# Patient Record
Sex: MALE | Race: WHITE | NOT HISPANIC OR LATINO
[De-identification: names, ages, dates, MRNs, and addresses within clinical notes are randomized per-mention and may not be internally consistent; named-entity substitution may affect disease eponyms.]

---

## 2022-06-07 ENCOUNTER — APPOINTMENT (OUTPATIENT)
Dept: ORTHOPEDIC SURGERY | Facility: CLINIC | Age: 57
End: 2022-06-07
Payer: COMMERCIAL

## 2022-06-07 ENCOUNTER — APPOINTMENT (OUTPATIENT)
Dept: ORTHOPEDIC SURGERY | Facility: CLINIC | Age: 57
End: 2022-06-07

## 2022-06-07 PROBLEM — Z00.00 ENCOUNTER FOR PREVENTIVE HEALTH EXAMINATION: Status: ACTIVE | Noted: 2022-06-07

## 2022-06-07 PROCEDURE — 99204 OFFICE O/P NEW MOD 45 MIN: CPT

## 2022-06-07 PROCEDURE — 73562 X-RAY EXAM OF KNEE 3: CPT | Mod: RT

## 2022-06-07 NOTE — PHYSICAL EXAM
[Right] : right knee [5___] : hamstring 5[unfilled]/5 [] : positive Valgus instability [Equivocal] : equivocal Talisha [TWNoteComboBox7] : flexion 125 degrees [de-identified] : extension 0 degrees

## 2022-06-07 NOTE — HISTORY OF PRESENT ILLNESS
[de-identified] : Date of  Onset 2-3 weeks  \par Pain: At Rest: 5 /10   \par With Activity: 9 /10 \par Affecting Sleep: N\par Difficulty with stairs: Y\par Difficulty getting in and out of car: Y\par Sit to stand stiffness:N\par Mechanism of injury:  MAYBE WHILE RAKING LEAVES \par  \par  QUALITY:  SHARP SHOOTING PAIN \par Improves with: ADVIL AND TYLENOL  \par Worse with:     ACTIVITY \par  \par Out of work:     NO\par  Occupation:   FOR  \par \par \par  \par

## 2022-06-07 NOTE — DISCUSSION/SUMMARY
[de-identified] : Due to PE and limitations, Pt most likely has a medial meniscus tear and will get MRI to confirm. Pt is to return with MRI results for further evaluation.

## 2022-06-10 ENCOUNTER — RESULT REVIEW (OUTPATIENT)
Age: 57
End: 2022-06-10

## 2022-06-23 ENCOUNTER — APPOINTMENT (OUTPATIENT)
Dept: ORTHOPEDIC SURGERY | Facility: CLINIC | Age: 57
End: 2022-06-23
Payer: COMMERCIAL

## 2022-06-23 DIAGNOSIS — I10 ESSENTIAL (PRIMARY) HYPERTENSION: ICD-10-CM

## 2022-06-23 PROCEDURE — 99214 OFFICE O/P EST MOD 30 MIN: CPT

## 2022-06-23 RX ORDER — MONTELUKAST 10 MG/1
10 TABLET, FILM COATED ORAL
Qty: 90 | Refills: 0 | Status: ACTIVE | COMMUNITY
Start: 2022-05-25

## 2022-06-23 RX ORDER — PANTOPRAZOLE 40 MG/1
40 TABLET, DELAYED RELEASE ORAL
Qty: 90 | Refills: 0 | Status: ACTIVE | COMMUNITY
Start: 2022-05-25

## 2022-06-23 RX ORDER — ESCITALOPRAM OXALATE 20 MG/1
20 TABLET ORAL
Qty: 90 | Refills: 0 | Status: ACTIVE | COMMUNITY
Start: 2022-05-29

## 2022-06-23 RX ORDER — AMLODIPINE BESYLATE 5 MG/1
5 TABLET ORAL
Qty: 90 | Refills: 0 | Status: ACTIVE | COMMUNITY
Start: 2022-04-19

## 2022-06-23 RX ORDER — LOSARTAN POTASSIUM AND HYDROCHLOROTHIAZIDE 12.5; 5 MG/1; MG/1
50-12.5 TABLET ORAL
Qty: 180 | Refills: 0 | Status: ACTIVE | COMMUNITY
Start: 2022-04-19

## 2022-06-23 NOTE — DATA REVIEWED
[MRI] : MRI [Right] : of the right [Knee] : knee [FreeTextEntry1] : Horizontal tear of the body and body/posterior horn junction medial meniscus\par violating the inferior meniscal surface with a radial component at the body\par medial meniscus.\par \par Mild patellofemoral compartment osteoarthritis.\par \par Moderate medial compartment osteoarthritis.\par \par Moderate-sized joint effusion with diffuse synovial proliferation.\par \par

## 2022-06-23 NOTE — PHYSICAL EXAM
[Right] : right knee [5___] : hamstring 5[unfilled]/5 [] : positive Valgus instability [Equivocal] : equivocal Talisha [TWNoteComboBox7] : flexion 125 degrees [de-identified] : extension 0 degrees

## 2022-07-12 ENCOUNTER — FORM ENCOUNTER (OUTPATIENT)
Age: 57
End: 2022-07-12

## 2022-07-12 ENCOUNTER — APPOINTMENT (OUTPATIENT)
Dept: ORTHOPEDIC SURGERY | Facility: CLINIC | Age: 57
End: 2022-07-12

## 2022-07-12 VITALS — BODY MASS INDEX: 32.51 KG/M2 | WEIGHT: 240 LBS | HEIGHT: 72 IN

## 2022-07-12 DIAGNOSIS — Z78.9 OTHER SPECIFIED HEALTH STATUS: ICD-10-CM

## 2022-07-12 DIAGNOSIS — I10 ESSENTIAL (PRIMARY) HYPERTENSION: ICD-10-CM

## 2022-07-12 DIAGNOSIS — Z86.79 PERSONAL HISTORY OF OTHER DISEASES OF THE CIRCULATORY SYSTEM: ICD-10-CM

## 2022-07-12 PROCEDURE — 99214 OFFICE O/P EST MOD 30 MIN: CPT

## 2022-07-12 RX ORDER — IBUPROFEN 800 MG/1
800 TABLET, FILM COATED ORAL
Qty: 42 | Refills: 0 | Status: ACTIVE | COMMUNITY
Start: 2022-06-27

## 2022-07-13 NOTE — ASSESSMENT
[FreeTextEntry1] : I spent time going in detail the problem and the associated risks/benefits of surgery. detailed complications but not limited to: of nerve injury, non union, repeat fx, dvt/pe postop, instability,transfusion, infection, NVA injury, stiffness, leg length discrepancy, inability to ambulate & death. discussed implant and model shown to patient. answered all patient questions. patient understands procedure and postop directions. Patient has failed conservative treatment and  PT is contraindicated at this time\par \par \par patient has fx rib.  I spoke with anes and they are ok to do surgery since patient can deep breath and no difficulties\par patient has sleep apnea with a setting of 12 and will need to do surgery at [place].

## 2022-07-14 ENCOUNTER — FORM ENCOUNTER (OUTPATIENT)
Age: 57
End: 2022-07-14

## 2022-07-17 ENCOUNTER — FORM ENCOUNTER (OUTPATIENT)
Age: 57
End: 2022-07-17

## 2022-07-18 ENCOUNTER — FORM ENCOUNTER (OUTPATIENT)
Age: 57
End: 2022-07-18

## 2022-07-19 ENCOUNTER — FORM ENCOUNTER (OUTPATIENT)
Age: 57
End: 2022-07-19

## 2022-07-20 ENCOUNTER — APPOINTMENT (OUTPATIENT)
Dept: ORTHOPEDIC SURGERY | Facility: HOSPITAL | Age: 57
End: 2022-07-20

## 2022-07-20 ENCOUNTER — RESULT REVIEW (OUTPATIENT)
Age: 57
End: 2022-07-20

## 2022-07-20 PROCEDURE — 29881 ARTHRS KNE SRG MNISECTMY M/L: CPT | Mod: AS,RT

## 2022-07-20 PROCEDURE — 29881 ARTHRS KNE SRG MNISECTMY M/L: CPT | Mod: RT

## 2022-07-20 PROCEDURE — 20610 DRAIN/INJ JOINT/BURSA W/O US: CPT | Mod: RT,59

## 2022-07-24 ENCOUNTER — FORM ENCOUNTER (OUTPATIENT)
Age: 57
End: 2022-07-24

## 2022-07-28 ENCOUNTER — FORM ENCOUNTER (OUTPATIENT)
Age: 57
End: 2022-07-28

## 2022-08-01 ENCOUNTER — APPOINTMENT (OUTPATIENT)
Dept: ORTHOPEDIC SURGERY | Facility: CLINIC | Age: 57
End: 2022-08-01

## 2022-08-01 VITALS — BODY MASS INDEX: 32.51 KG/M2 | WEIGHT: 240 LBS | HEIGHT: 72 IN

## 2022-08-01 DIAGNOSIS — Z87.19 PERSONAL HISTORY OF OTHER DISEASES OF THE DIGESTIVE SYSTEM: ICD-10-CM

## 2022-08-01 DIAGNOSIS — Z86.59 PERSONAL HISTORY OF OTHER MENTAL AND BEHAVIORAL DISORDERS: ICD-10-CM

## 2022-08-01 PROCEDURE — 99024 POSTOP FOLLOW-UP VISIT: CPT

## 2022-08-01 RX ORDER — AMOXICILLIN AND CLAVULANATE POTASSIUM 875; 125 MG/1; MG/1
875-125 TABLET, COATED ORAL
Qty: 20 | Refills: 0 | Status: COMPLETED | COMMUNITY
Start: 2021-12-30 | End: 2022-08-01

## 2022-08-01 NOTE — ASSESSMENT
[FreeTextEntry1] : I spent time going in detail the problem and the associated risks/benefits of surgery. detailed complications but not limited to: of nerve injury, non union, repeat fx, dvt/pe postop, instability,transfusion, infection, NVA injury, stiffness, leg length discrepancy, inability to ambulate & death. discussed implant and model shown to patient. answered all patient questions. patient understands procedure and postop directions. Patient has failed conservative treatment and  PT is contraindicated at this time\par \par \par patient has fx rib.  I spoke with anes and they are ok to do surgery since patient can deep breath and no difficulties\par patient has sleep apnea with a setting of 12 and will need to do surgery at [place].

## 2022-08-01 NOTE — PHYSICAL EXAM
Initial Post-SHOULDER Arthroscopy - Biceps Tenodesis   Home Instruction Sheet         Biceps Tenodesis        Activity:    • Do not drive a motor vehicle, operate machinery or appliances, make important decisions, sign legal documents, or drink alcohol for at least the next 24 hours.  Continue these restrictions while you are taking pain medications.  •  If you had a Nerve Block for pain control, your arm will be numb/tingling/weak during the next 8 - 24 hours.    Keep your sling/immobilizer on during the first 24 hours.  • After nerve block wears off you may remove the sling/immobilizer for dressing, and washing the arm; otherwise wear it at all times.  • No heavy lifting, pushing, pulling, or reaching with your surgical arm.  • Use cold therapy (Don Ines Ice Man) continuously by rotating 20 - 30 minutes on and 20 - 30 minutes off for first 48 - 72 hours after surgery.  Do not apply cold pad directly on your skin (see ’s instructions on how to use Don Ines Ice Man).  After first 48 - 72 hours, use cold therapy as needed for pain and swelling.  • It is common to have mild to moderate swelling in your upper and lower arm.  This should improve over the first five (5) days.  To help decrease this swelling, lie down on the non-surgical side with a pillow propped behind your back so the surgical shoulder is up or above the level of the heart.    • If provided with compression stockings (CARLOS ALBERTO hose) wear these during the day until you return for 1st post-op appointment; OK to remove @ night.  Do frequent ankle pumps to help prevent blood clots in your legs.    Sling: must be worn at all times, except when bathing or dressing. No exceptions. Do not modify the sling.    Bandage and Wound Care:  • Remove the bandage 24 hours after surgery and apply a new clean dressing. Continue to do this daily until there is no drainage from incisional sites. At that time, apply band-aids to keep incisions covered until follow up,  changing as needed.  Exception:  The incision site along the armpit region does not need to be covered with Band-Aids.  This is a waterproof glue that his use.  • Once there is no drainage, you may shower, and wounds can get wet. Remember to apply band-aid after showering.  o If wounds draining, do not shower and continue with daily dressing changes. Call your doctor’s office if drainage continues more than 5 days after surgery.  • No baths, hot tubs, or swimming until advised by your doctor, usually 2-3 weeks.      MEDICATIONS    Postop days 1 - 2:    [x]  Pain medication as prescribed:  Percocet 5/325 mg, take 1-2 tablets every 4 hours as needed for pain  [x]  Meloxicam 15 mg taken once daily for inflammation. Continue for 2 weeks.    Postop days 3-10:  1.  Percocet 5/325 mg:  Decreased to 1-2 tablets at nighttime only until prescription is gone  2. Continue anti-inflammatory medication Meloxicam  3. Start over-the-counter extra strength Tylenol / acetaminophen 500 mg tablets.  You can take safely up to 4000 mg per day.     * Taking 2 tablets every 6 hours = 4,000 mg per day but keep in mind that each Percocet tablet has 325 mg of Tylenol in it.    After postop day 10:  1. Continue anti-inflammatory medication such as:  Meloxicam.  2. Continue extra strength Tylenol / acetaminophen 500 mg tablet:  2 tablets every 6 hours, for maximal of 4000 mg per day.    Narcotic medications:  · Take only as prescribed. Store in a place inaccessible to children/pets.  · Do not share medicine. Do not crush, snort, dissolve, or inject the medicine. If you cannot swallow medicine, please call.   · Do NOT drink alcohol while on this medicine.  · Leftover narcotic medicine may be taken to police departments/ office    B.  Clotting Prevention   p  [x]  Blood Thinner:  Xarelto 10 mg daily for 30 days, then transition to aspirin 81 mg daily for 5 weeks unless on regularly prior.  Exercise as prescribed, and rita  stockings.      **NOTE** Highly encouraged to be moving around to prevent blood clot and aid in circulation for wound healing.      C.  Other Medications  *  Multi-purpose vitamin daily x 30 days.  Make sure has vitamin B and C    *  Unless you are on current regime prior, proceed with Vitamin D supplement as prescribed:  2,000 IU daily until further advised.  *  Stool softener:  Over the counter Colace twice daily until off all pain medication and normal stool.  *  Continue with current home medication regime.  Any concerns about them contact      ** If you are diabetic, it is critical that you maintain well controlled blood sugar levels because elevated blood sugar levels will significantly increase the risk of infection and affect the healing process.  If issues with controlling please contact your primary care physician.    Diet:    • Start with clear liquids, advance to solid food to decrease your chance of nausea.  • Resume your normal diet when tolerated.  • Advise taking one multi vitamin daily to aid in the healing process and reduction in infection.     • Try and increase your fluids, fibers, fruits, and vegetables to prevent constipation from pain medications.    Surgery Nutrition            Post-op Nutrition Guidelines  Key for a successful recovery and help getting back to things you enjoy sooner. Postop nutrition is essential to a successful recovery.  Surgery increases the demand tenfold, so feeling is critical with food along with promoting wound healing and preventing infection.    1.  Protein- eat protein at each meal and snack for muscle and bone healing.  Example: Meat, fevers, eggs, poultry, not, so I product and caught or dried beans and tofu. Ideal 1.5-2.0 g/kg body weight.  2.  Fiber - Added to each meal and snacks fruits, vegetables, Cook beings in whole gr aides in helping maintain normal bowel movement. In addition a stool softener prescribed.  3.  Vitamin C - important in assisting with  [Right] : right knee wound healing and assisting in bone formation along with tissue healing. Examples: Citrus fruits, green and red peppers, broccoli, spinach, blueberries, and strawberries. Estimated 1000 mg daily.  4.  Calcium - key for bone formation. Examples: Milk, yogurt and cheese, almonds, whey protein, green leafy veggies, may recommend vitamin D supplementation for better calcium absorption.   5.  Water - aid in avoiding constipation as well as recommended for wound healing and skin health. Recommended 6-8 glasses a day.  6.  Vitamin A - important for growth and development for the maintenance of the immune system and bone repair, white blood cells and mucous membranes.  Examples: Sabas Hinsonaf a vegetables, Orange are yellow vegetables, cantaloupe, fortified dairy products and cereal.  7.  Zinc - health wound healing and aid in helping immune system fight off invading bacteria and viruses. Examples: Fortified cereals, red meat and seafood, eggs and seeds.  8.  Vitamin D - length to shorter recovery time needed assist GI tract to observe calcium. Recommended 5094-4423 international units daily.  9.  Antioxidants - help in reducing free radicals ( unstable atoms) that can damage cells and affect recovery.  Examples: Dark chocolate, artichokes, blueberries, strawberries, pecans, raspberries, kale, beans, red cabbage, beets and spinach.       ** If you are diabetic, it is critical that you maintain well controlled blood sugar levels because elevated blood sugar levels will significantly increase the risk of infection and affect the healing process.  If issues controlling please contact your primary care physician.    Call Your Doctor If:  • You have severe pain not controlled by pain medications.  • Increased incisional swelling, redness, heat or bleeding.  • Incisional drainage that last more than 3 days after surgery.  • Numbness or tingling in the arm/shoulder.  • Temperature greater than 101ºF.  • If you are unable to  [5___] : hamstring 5[unfilled]/5 empty your bladder in 8 - 12 hours after your surgery.    POST-SHOULDER ARTHROSCOPY EXERCISES     Perform only if instructed by surgeon or discharge nurse  Begin 1st day after surgery, 2-3x per day, --15 repetitions each time    Shoulder Exercises:    [x]  NO shoulder exercises until instructed by your doctor. It is OK to perform gentle range of motion exercises with the hand and wrist.  Hand and finger squeezes.      []  Start your exercises after your first dressing change (see attached sheet). Do 2-3 times each day.     1.  Wrist Squeezes / Finger activity  Use a towel and perform daily squeezes with surgical hand.  This will aid in reducing swelling in the hand. Perform wrist activities.     .    Home Instruction Sheet  ANESTHESIA for ADULT     What are the side effects?  Side effects depend on the medication used, and may not even be present.    Most Common Sometimes   Irritability  Poor Balance  Sleeping for Several Hours  Drowsiness  Fatigue  Difficulty Concentrating Change in Behavior  Hyperactivity  Nausea (upset stomach)  Gas (flatulence)  Dizziness  Hiccups  Constipation  Blurred Vision     1. The effects of sedation medicine can last up to 24 hours.  You may be drowsy or irritable for 2 to 8 hours after receiving medicine.  2. You may need to sleep after leaving the testing area.  Sleeping after sedation will help reduce irritability.  3. Diet:  - Do not give anything to eat or drink until you are fully awake.  Eat a light meal and advance to a normal diet unless instructed differently:   - Do not drink alcohol beverages for the next 24 hours.  - Avoid greasy or spicy foods today.  4. Activity:  - You may be dizzy and/or unsteady.  Ask for help walking, using the bathroom, or stairs to protect yourself from injury.  - You should not drive a vehicle, operate machinery or power tools for 24 hours because your reflexes may be slow and your vision may be blurry.  - Do not sign any legally binding documents  [Equivocal] : equivocal Talisha or make important decisions for 24 hours after receiving sedation.  5. Medications:  - Unless told otherwise, do not take any non-prescription medications (cold medicine, etc.) for 24 hours, as these medications in combination with sedation medication, can cause increased drowsiness.  If you feel that you need over the counter medication, discuss with your physician.    When Should I Call the Doctor?  - Vomiting more than twice.  - Extreme irritability or unusual changes in behavior.  - Trouble arousing.  - Inability to urinate.  - Trouble breathing - call 911.                   [] : non-antalgic [FreeTextEntry8] : MILD MJL [TWNoteComboBox7] : flexion 125 degrees [de-identified] : extension 0 degrees

## 2022-08-01 NOTE — HISTORY OF PRESENT ILLNESS
[de-identified] : s/p RT knee scope 7/20/2022. Denies fever, chills, shortness of breath, redness, and pain. Denies pain rx.  [FreeTextEntry1] : RT knee

## 2022-08-30 ENCOUNTER — APPOINTMENT (OUTPATIENT)
Dept: ORTHOPEDIC SURGERY | Facility: CLINIC | Age: 57
End: 2022-08-30

## 2022-08-30 VITALS — HEIGHT: 72 IN | WEIGHT: 240 LBS | BODY MASS INDEX: 32.51 KG/M2

## 2022-08-30 PROCEDURE — 99214 OFFICE O/P EST MOD 30 MIN: CPT | Mod: 24

## 2022-08-30 PROCEDURE — 73562 X-RAY EXAM OF KNEE 3: CPT | Mod: LT

## 2022-08-30 RX ORDER — MELOXICAM 15 MG/1
15 TABLET ORAL DAILY
Qty: 14 | Refills: 0 | Status: ACTIVE | COMMUNITY
Start: 2022-08-30 | End: 1900-01-01

## 2022-08-30 NOTE — PHYSICAL EXAM
[Right] : right knee [NL (0)] : extension 0 degrees [Left] : left knee [5___] : hamstring 5[unfilled]/5 [] : patient ambulates without assistive device [AP] : anteroposterior [Lateral] : lateral [[place]] : skyline [Mild tricompartmental OA medial narrowing] : Mild tricompartmental OA medial narrowing [TWNoteComboBox7] : flexion 120 degrees [de-identified] : extension 0 degrees

## 2022-08-30 NOTE — REASON FOR VISIT
[FreeTextEntry2] : HERE TODAY FOR F/U RIGHT KNEE SCOPE 7/20/22.  PATIENT MOWED LAWN 2 WEEKS POSTOP AND PAIN AND SWELLING INCREASED. PATIENT USED ADVIL/ICE AND DECREASED ACTIVITY AND RIGHT KNEE SETTLED DOWN.  C/O LEFT KNEE PAIN STARTED END OF JULY/NKI.\par MEDIAL SIDED PAIN, NO SWELLING, USING ADVIL AND TYLENOL FOR PAIN.

## 2022-09-20 ENCOUNTER — APPOINTMENT (OUTPATIENT)
Dept: ORTHOPEDIC SURGERY | Facility: CLINIC | Age: 57
End: 2022-09-20

## 2023-01-12 ENCOUNTER — APPOINTMENT (OUTPATIENT)
Dept: ORTHOPEDIC SURGERY | Facility: CLINIC | Age: 58
End: 2023-01-12
Payer: COMMERCIAL

## 2023-01-12 PROCEDURE — 99213 OFFICE O/P EST LOW 20 MIN: CPT | Mod: 25

## 2023-01-12 PROCEDURE — 20610 DRAIN/INJ JOINT/BURSA W/O US: CPT | Mod: 50

## 2023-01-12 NOTE — PHYSICAL EXAM
[Left] : left knee [Right] : right knee [5___] : hamstring 5[unfilled]/5 [] : patient ambulates without assistive device [FreeTextEntry3] : minimal swelling [TWNoteComboBox7] : flexion 125 degrees [de-identified] : extension 0 degrees

## 2023-01-12 NOTE — PROCEDURE
[Large Joint Injection] : Large joint injection [Bilateral] : bilaterally of the [Knee] : knee [Pain] : pain [Inflammation] : inflammation [X-ray evidence of Osteoarthritis on this or prior visit] : x-ray evidence of Osteoarthritis on this or prior visit [Betadine] : betadine [Ethyl Chloride sprayed topically] : ethyl chloride sprayed topically [Sterile technique used] : sterile technique used [Gel-Syn (16.8mg)] : 16.8mg of Gel-Syn [#1] : series #1 [] : Patient tolerated procedure well [Call if redness, pain or fever occur] : call if redness, pain or fever occur [Apply ice for 15min out of every hour for the next 12-24 hours as tolerated] : apply ice for 15 minutes out of every hour for the next 12-24 hours as tolerated [Previous OTC use and PT nontherapeutic] : patient has tried OTC's including aspirin, Ibuprofen, Aleve, etc or prescription NSAIDS, and/or exercises at home and/or physical therapy without satisfactory response [Patient had decreased mobility in the joint] : patient had decreased mobility in the joint [Risks, benefits, alternatives discussed / Verbal consent obtained] : the risks benefits, and alternatives have been discussed, and verbal consent was obtained

## 2023-01-12 NOTE — DISCUSSION/SUMMARY
[de-identified] : Lengthy discussion regarding options was had with the patient. Nonsurgical options including but not limited to cortisone,viscosupplementation, anti-inflammatory medications, activity modification,  non impact exercise /maintaining a healthy BMI, bracing, and icing were reviewed. Surgical options including but not limited to arthroscopy, and joint replacement were discussed as was risks, benefits and alternatives. All questions were answered. \par Patient was informed he will eventually need a left knee replacement but not at this time.\par Recommend lubricant injections. Pt received first gelsyn injections in bilateral knees in office today. Pt tolerated procedure well and was advised to ice. Follow up one week for second series.

## 2023-01-12 NOTE — HISTORY OF PRESENT ILLNESS
[de-identified] : F/U Bilateral Knees   Hx Right Arthroscopy MMT  Chondro 7/20/22 Left 2016\par Ambulation is difficult on both knees\par Affecting Sleep:N\par Difficulty with stairs: Y\par Difficulty getting in and out of car:Y \par Sit to stand stiffness: Y\par Mechanism of injury:  NKI  \par    \par \par \par  \par

## 2023-01-20 ENCOUNTER — APPOINTMENT (OUTPATIENT)
Dept: ORTHOPEDIC SURGERY | Facility: CLINIC | Age: 58
End: 2023-01-20
Payer: COMMERCIAL

## 2023-01-20 PROCEDURE — 20610 DRAIN/INJ JOINT/BURSA W/O US: CPT | Mod: 50

## 2023-01-20 NOTE — REASON FOR VISIT
[FreeTextEntry2] : pt is here for second gelsyn injection in bilateral knees. Feels slight relief from the first injection. Denies pain medications.

## 2023-01-20 NOTE — DISCUSSION/SUMMARY
[de-identified] : Pt received second gelsyn injections in bilateral knees in office today. Pt tolerated procedure well and was advised to ice. Follow up one week for third series.

## 2023-01-20 NOTE — PROCEDURE
[Large Joint Injection] : Large joint injection [Bilateral] : bilaterally of the [Knee] : knee [Pain] : pain [Inflammation] : inflammation [X-ray evidence of Osteoarthritis on this or prior visit] : x-ray evidence of Osteoarthritis on this or prior visit [Betadine] : betadine [Ethyl Chloride sprayed topically] : ethyl chloride sprayed topically [Sterile technique used] : sterile technique used [Gel-Syn (16.8mg)] : 16.8mg of Gel-Syn [#2] : series #2 [] : Patient tolerated procedure well [Call if redness, pain or fever occur] : call if redness, pain or fever occur [Apply ice for 15min out of every hour for the next 12-24 hours as tolerated] : apply ice for 15 minutes out of every hour for the next 12-24 hours as tolerated [Previous OTC use and PT nontherapeutic] : patient has tried OTC's including aspirin, Ibuprofen, Aleve, etc or prescription NSAIDS, and/or exercises at home and/or physical therapy without satisfactory response [Patient had decreased mobility in the joint] : patient had decreased mobility in the joint [Risks, benefits, alternatives discussed / Verbal consent obtained] : the risks benefits, and alternatives have been discussed, and verbal consent was obtained

## 2023-01-20 NOTE — PHYSICAL EXAM
[Left] : left knee [Right] : right knee [5___] : hamstring 5[unfilled]/5 [] : patient ambulates without assistive device [FreeTextEntry3] : minimal swelling [TWNoteComboBox7] : flexion 125 degrees [de-identified] : extension 0 degrees

## 2023-01-27 ENCOUNTER — APPOINTMENT (OUTPATIENT)
Dept: ORTHOPEDIC SURGERY | Facility: CLINIC | Age: 58
End: 2023-01-27
Payer: COMMERCIAL

## 2023-01-27 DIAGNOSIS — S83.241A OTHER TEAR OF MEDIAL MENISCUS, CURRENT INJURY, RIGHT KNEE, INITIAL ENCOUNTER: ICD-10-CM

## 2023-01-27 DIAGNOSIS — M25.561 PAIN IN RIGHT KNEE: ICD-10-CM

## 2023-01-27 PROCEDURE — 20610 DRAIN/INJ JOINT/BURSA W/O US: CPT | Mod: 50

## 2023-01-27 NOTE — PROCEDURE
[Large Joint Injection] : Large joint injection [Bilateral] : bilaterally of the [Knee] : knee [Pain] : pain [Inflammation] : inflammation [X-ray evidence of Osteoarthritis on this or prior visit] : x-ray evidence of Osteoarthritis on this or prior visit [Betadine] : betadine [Ethyl Chloride sprayed topically] : ethyl chloride sprayed topically [Sterile technique used] : sterile technique used [Gel-Syn (16.8mg)] : 16.8mg of Gel-Syn [] : Patient tolerated procedure well [Call if redness, pain or fever occur] : call if redness, pain or fever occur [Apply ice for 15min out of every hour for the next 12-24 hours as tolerated] : apply ice for 15 minutes out of every hour for the next 12-24 hours as tolerated [Previous OTC use and PT nontherapeutic] : patient has tried OTC's including aspirin, Ibuprofen, Aleve, etc or prescription NSAIDS, and/or exercises at home and/or physical therapy without satisfactory response [Patient had decreased mobility in the joint] : patient had decreased mobility in the joint [Risks, benefits, alternatives discussed / Verbal consent obtained] : the risks benefits, and alternatives have been discussed, and verbal consent was obtained [#3] : series #3

## 2023-01-27 NOTE — REASON FOR VISIT
[FreeTextEntry2] : pt is here for third gelsyn injection in bilateral knees. Feels slight relief from the first and second injections. Denies pain medications.

## 2023-01-27 NOTE — DISCUSSION/SUMMARY
[de-identified] : Pt received third gelsyn injections in bilateral knees in office today. Pt tolerated procedure well and was advised to ice. \par \par Discussed importance of non-impact exercise and muscle stretching before and after exercise.

## 2023-01-27 NOTE — PHYSICAL EXAM
[Left] : left knee [Right] : right knee [5___] : hamstring 5[unfilled]/5 [] : patient ambulates without assistive device [FreeTextEntry3] : minimal swelling [TWNoteComboBox7] : flexion 125 degrees [de-identified] : extension 0 degrees

## 2023-08-25 ENCOUNTER — APPOINTMENT (OUTPATIENT)
Dept: ORTHOPEDIC SURGERY | Facility: CLINIC | Age: 58
End: 2023-08-25
Payer: COMMERCIAL

## 2023-08-25 PROCEDURE — 20610 DRAIN/INJ JOINT/BURSA W/O US: CPT | Mod: LT

## 2023-08-25 NOTE — REASON FOR VISIT
[FreeTextEntry2] : here today for gelsyn injection left knee.  patient had gelsyn injections in eduardo knees in 1/23.  states right knee is still good. left knee pain 7/10..  has been using dual action advil/tylenol

## 2023-08-25 NOTE — PROCEDURE
[Large Joint Injection] : Large joint injection [Left] : of the left [Knee] : knee [Pain] : pain [Inflammation] : inflammation [Betadine] : betadine [Ethyl Chloride sprayed topically] : ethyl chloride sprayed topically [Gel-Syn (16.8mg)] : 16.8mg of Gel-Syn [#1] : series #1 [Call if redness, pain or fever occur] : call if redness, pain or fever occur [Apply ice for 15min out of every hour for the next 12-24 hours as tolerated] : apply ice for 15 minutes out of every hour for the next 12-24 hours as tolerated [Previous OTC use and PT nontherapeutic] : patient has tried OTC's including aspirin, Ibuprofen, Aleve, etc or prescription NSAIDS, and/or exercises at home and/or physical therapy without satisfactory response [Patient had decreased mobility in the joint] : patient had decreased mobility in the joint [Risks, benefits, alternatives discussed / Verbal consent obtained] : the risks benefits, and alternatives have been discussed, and verbal consent was obtained

## 2023-08-25 NOTE — DISCUSSION/SUMMARY
[de-identified] : Discussed importance of non-impact exercise and muscle stretching before and after exercise. Reviewed x-rays . Explained the importance of ice and rest.  f/u 1 week

## 2023-08-25 NOTE — PHYSICAL EXAM
[Left] : left knee [Right] : right knee [5___] : hamstring 5[unfilled]/5 [] : patient ambulates without assistive device [FreeTextEntry3] : minimal swelling [TWNoteComboBox7] : flexion 120 degrees [de-identified] : extension 0 degrees

## 2023-09-01 ENCOUNTER — APPOINTMENT (OUTPATIENT)
Dept: ORTHOPEDIC SURGERY | Facility: CLINIC | Age: 58
End: 2023-09-01
Payer: COMMERCIAL

## 2023-09-01 PROCEDURE — 20610 DRAIN/INJ JOINT/BURSA W/O US: CPT | Mod: LT

## 2023-09-01 NOTE — PHYSICAL EXAM
[Left] : left knee [Right] : right knee [5___] : hamstring 5[unfilled]/5 [] : patient ambulates without assistive device [FreeTextEntry3] : minimal swelling [TWNoteComboBox7] : flexion 120 degrees [de-identified] : extension 0 degrees

## 2023-09-01 NOTE — PROCEDURE
[Large Joint Injection] : Large joint injection [Left] : of the left [Knee] : knee [Pain] : pain [Inflammation] : inflammation [X-ray evidence of Osteoarthritis on this or prior visit] : x-ray evidence of Osteoarthritis on this or prior visit [Betadine] : betadine [Ethyl Chloride sprayed topically] : ethyl chloride sprayed topically [Sterile technique used] : sterile technique used [Gel-Syn (16.8mg)] : 16.8mg of Gel-Syn [#2] : series #2 [] : Patient tolerated procedure well [Call if redness, pain or fever occur] : call if redness, pain or fever occur [Apply ice for 15min out of every hour for the next 12-24 hours as tolerated] : apply ice for 15 minutes out of every hour for the next 12-24 hours as tolerated [Patient was advised to rest the joint(s) for ____ days] : patient was advised to rest the joint(s) for [unfilled] days [Previous OTC use and PT nontherapeutic] : patient has tried OTC's including aspirin, Ibuprofen, Aleve, etc or prescription NSAIDS, and/or exercises at home and/or physical therapy without satisfactory response [Patient had decreased mobility in the joint] : patient had decreased mobility in the joint [Risks, benefits, alternatives discussed / Verbal consent obtained] : the risks benefits, and alternatives have been discussed, and verbal consent was obtained

## 2023-09-01 NOTE — DISCUSSION/SUMMARY
[de-identified] : Patient here for Gelsyn #2. Reports minor improvement after the first injection.   Follow up in 1 week for injection #3.

## 2023-09-08 ENCOUNTER — APPOINTMENT (OUTPATIENT)
Dept: ORTHOPEDIC SURGERY | Facility: CLINIC | Age: 58
End: 2023-09-08
Payer: COMMERCIAL

## 2023-09-08 VITALS — WEIGHT: 240 LBS | HEIGHT: 72 IN | BODY MASS INDEX: 32.51 KG/M2

## 2023-09-08 DIAGNOSIS — M17.12 UNILATERAL PRIMARY OSTEOARTHRITIS, LEFT KNEE: ICD-10-CM

## 2023-09-08 PROCEDURE — 20610 DRAIN/INJ JOINT/BURSA W/O US: CPT | Mod: LT

## 2023-09-08 NOTE — PHYSICAL EXAM
[Left] : left knee [Right] : right knee [5___] : hamstring 5[unfilled]/5 [] : patient ambulates without assistive device [FreeTextEntry3] : minimal swelling [TWNoteComboBox7] : flexion 120 degrees [de-identified] : extension 0 degrees

## 2023-09-08 NOTE — PROCEDURE
[Large Joint Injection] : Large joint injection [Left] : of the left [Knee] : knee [Pain] : pain [Inflammation] : inflammation [X-ray evidence of Osteoarthritis on this or prior visit] : x-ray evidence of Osteoarthritis on this or prior visit [Betadine] : betadine [Ethyl Chloride sprayed topically] : ethyl chloride sprayed topically [Sterile technique used] : sterile technique used [Gel-Syn (16.8mg)] : 16.8mg of Gel-Syn [#3] : series #3 [] : Patient tolerated procedure well [Call if redness, pain or fever occur] : call if redness, pain or fever occur [Apply ice for 15min out of every hour for the next 12-24 hours as tolerated] : apply ice for 15 minutes out of every hour for the next 12-24 hours as tolerated [Patient was advised to rest the joint(s) for ____ days] : patient was advised to rest the joint(s) for [unfilled] days [Previous OTC use and PT nontherapeutic] : patient has tried OTC's including aspirin, Ibuprofen, Aleve, etc or prescription NSAIDS, and/or exercises at home and/or physical therapy without satisfactory response [Patient had decreased mobility in the joint] : patient had decreased mobility in the joint [Risks, benefits, alternatives discussed / Verbal consent obtained] : the risks benefits, and alternatives have been discussed, and verbal consent was obtained

## 2023-09-08 NOTE — DISCUSSION/SUMMARY
[de-identified] : Large joint injection was performed bilaterally of the knee. The indication for this procedure was pain, inflammation of Osteoarthritis on this or prior visit. The site was prepped with betadine, ethyl chloride sprayed topically and sterile technique used. An injection of 16.8mg of Gel-Syn, series #3 was used.  Patient tolerated procedure well. Patient was advised to call if redness, pain or fever occur and apply ice for 15 minutes out of every hour for the next 12-24 hours as tolerated.    Patient has tried OTC's including aspirin, Ibuprofen, Aleve, etc or prescription NSAIDS, and/or exercises at home and/or physical therapy without satisfactory response, patient had decreased mobility in the joint and the risks benefits, and alternatives have been discussed, and verbal consent was obtained.  f/u PRN

## 2025-06-27 ENCOUNTER — APPOINTMENT (OUTPATIENT)
Dept: ORTHOPEDIC SURGERY | Facility: CLINIC | Age: 60
End: 2025-06-27

## 2025-06-27 PROCEDURE — 73010 X-RAY EXAM OF SHOULDER BLADE: CPT | Mod: RT

## 2025-06-27 PROCEDURE — 73030 X-RAY EXAM OF SHOULDER: CPT | Mod: RT

## 2025-06-27 PROCEDURE — 99214 OFFICE O/P EST MOD 30 MIN: CPT | Mod: 25
